# Patient Record
Sex: FEMALE | Race: WHITE | NOT HISPANIC OR LATINO
[De-identification: names, ages, dates, MRNs, and addresses within clinical notes are randomized per-mention and may not be internally consistent; named-entity substitution may affect disease eponyms.]

---

## 2017-08-02 ENCOUNTER — APPOINTMENT (OUTPATIENT)
Dept: SPEECH THERAPY | Facility: CLINIC | Age: 62
End: 2017-08-02

## 2017-08-02 ENCOUNTER — OUTPATIENT (OUTPATIENT)
Dept: OUTPATIENT SERVICES | Facility: HOSPITAL | Age: 62
LOS: 1 days | Discharge: HOME | End: 2017-08-02

## 2017-08-15 ENCOUNTER — APPOINTMENT (OUTPATIENT)
Dept: SPEECH THERAPY | Facility: CLINIC | Age: 62
End: 2017-08-15

## 2017-08-22 ENCOUNTER — APPOINTMENT (OUTPATIENT)
Dept: SPEECH THERAPY | Facility: CLINIC | Age: 62
End: 2017-08-22

## 2017-08-24 ENCOUNTER — OUTPATIENT (OUTPATIENT)
Dept: OUTPATIENT SERVICES | Facility: HOSPITAL | Age: 62
LOS: 1 days | End: 2017-08-24
Payer: COMMERCIAL

## 2017-08-24 ENCOUNTER — APPOINTMENT (OUTPATIENT)
Dept: MAMMOGRAPHY | Facility: HOSPITAL | Age: 62
End: 2017-08-24
Payer: COMMERCIAL

## 2017-08-24 DIAGNOSIS — Z12.31 ENCOUNTER FOR SCREENING MAMMOGRAM FOR MALIGNANT NEOPLASM OF BREAST: ICD-10-CM

## 2017-08-24 PROCEDURE — 77063 BREAST TOMOSYNTHESIS BI: CPT | Mod: 26

## 2017-08-24 PROCEDURE — 77067 SCR MAMMO BI INCL CAD: CPT

## 2017-08-24 PROCEDURE — 77063 BREAST TOMOSYNTHESIS BI: CPT

## 2017-08-24 PROCEDURE — G0202: CPT | Mod: 26

## 2017-08-25 DIAGNOSIS — R49.0 DYSPHONIA: ICD-10-CM

## 2017-08-29 ENCOUNTER — APPOINTMENT (OUTPATIENT)
Dept: SPEECH THERAPY | Facility: CLINIC | Age: 62
End: 2017-08-29

## 2018-08-27 ENCOUNTER — APPOINTMENT (OUTPATIENT)
Dept: MAMMOGRAPHY | Facility: HOSPITAL | Age: 63
End: 2018-08-27

## 2018-10-26 ENCOUNTER — APPOINTMENT (OUTPATIENT)
Dept: ULTRASOUND IMAGING | Facility: HOSPITAL | Age: 63
End: 2018-10-26
Payer: COMMERCIAL

## 2018-10-26 ENCOUNTER — APPOINTMENT (OUTPATIENT)
Dept: MAMMOGRAPHY | Facility: HOSPITAL | Age: 63
End: 2018-10-26
Payer: COMMERCIAL

## 2018-10-26 ENCOUNTER — OUTPATIENT (OUTPATIENT)
Dept: OUTPATIENT SERVICES | Facility: HOSPITAL | Age: 63
LOS: 1 days | End: 2018-10-26
Payer: COMMERCIAL

## 2018-10-26 DIAGNOSIS — Z00.8 ENCOUNTER FOR OTHER GENERAL EXAMINATION: ICD-10-CM

## 2018-10-26 PROCEDURE — 77063 BREAST TOMOSYNTHESIS BI: CPT | Mod: 26

## 2018-10-26 PROCEDURE — 76641 ULTRASOUND BREAST COMPLETE: CPT

## 2018-10-26 PROCEDURE — 76641 ULTRASOUND BREAST COMPLETE: CPT | Mod: 26

## 2018-10-26 PROCEDURE — 77063 BREAST TOMOSYNTHESIS BI: CPT

## 2018-10-26 PROCEDURE — 77067 SCR MAMMO BI INCL CAD: CPT

## 2018-10-26 PROCEDURE — 77067 SCR MAMMO BI INCL CAD: CPT | Mod: 26

## 2018-11-09 ENCOUNTER — APPOINTMENT (OUTPATIENT)
Dept: RADIOLOGY | Facility: HOSPITAL | Age: 63
End: 2018-11-09
Payer: COMMERCIAL

## 2018-11-09 ENCOUNTER — OUTPATIENT (OUTPATIENT)
Dept: OUTPATIENT SERVICES | Facility: HOSPITAL | Age: 63
LOS: 1 days | End: 2018-11-09
Payer: COMMERCIAL

## 2018-11-09 DIAGNOSIS — M79.602 PAIN IN LEFT ARM: ICD-10-CM

## 2018-11-09 PROCEDURE — 73070 X-RAY EXAM OF ELBOW: CPT | Mod: 26,LT

## 2018-11-09 PROCEDURE — 73070 X-RAY EXAM OF ELBOW: CPT

## 2019-07-29 ENCOUNTER — EMERGENCY (EMERGENCY)
Facility: HOSPITAL | Age: 64
LOS: 1 days | Discharge: HOME | End: 2019-07-29
Attending: EMERGENCY MEDICINE | Admitting: EMERGENCY MEDICINE
Payer: COMMERCIAL

## 2019-07-29 VITALS
DIASTOLIC BLOOD PRESSURE: 81 MMHG | OXYGEN SATURATION: 98 % | SYSTOLIC BLOOD PRESSURE: 156 MMHG | RESPIRATION RATE: 18 BRPM

## 2019-07-29 VITALS
RESPIRATION RATE: 18 BRPM | HEIGHT: 66 IN | WEIGHT: 190.04 LBS | DIASTOLIC BLOOD PRESSURE: 91 MMHG | HEART RATE: 80 BPM | SYSTOLIC BLOOD PRESSURE: 176 MMHG | OXYGEN SATURATION: 98 % | TEMPERATURE: 98 F

## 2019-07-29 LAB
ALBUMIN SERPL ELPH-MCNC: 4.1 G/DL — SIGNIFICANT CHANGE UP (ref 3.3–5)
ALP SERPL-CCNC: 53 U/L — SIGNIFICANT CHANGE UP (ref 40–120)
ALT FLD-CCNC: 28 U/L DA — SIGNIFICANT CHANGE UP (ref 10–45)
ANION GAP SERPL CALC-SCNC: 11 MMOL/L — SIGNIFICANT CHANGE UP (ref 5–17)
APPEARANCE UR: CLEAR — SIGNIFICANT CHANGE UP
AST SERPL-CCNC: 17 U/L — SIGNIFICANT CHANGE UP (ref 10–40)
BACTERIA # UR AUTO: ABNORMAL /HPF
BASOPHILS # BLD AUTO: 0.04 K/UL — SIGNIFICANT CHANGE UP (ref 0–0.2)
BASOPHILS NFR BLD AUTO: 0.4 % — SIGNIFICANT CHANGE UP (ref 0–2)
BILIRUB SERPL-MCNC: 0.4 MG/DL — SIGNIFICANT CHANGE UP (ref 0.2–1.2)
BILIRUB UR-MCNC: NEGATIVE — SIGNIFICANT CHANGE UP
BUN SERPL-MCNC: 12 MG/DL — SIGNIFICANT CHANGE UP (ref 7–23)
CALCIUM SERPL-MCNC: 9.1 MG/DL — SIGNIFICANT CHANGE UP (ref 8.4–10.5)
CHLORIDE SERPL-SCNC: 103 MMOL/L — SIGNIFICANT CHANGE UP (ref 96–108)
CO2 SERPL-SCNC: 26 MMOL/L — SIGNIFICANT CHANGE UP (ref 22–31)
COLOR SPEC: YELLOW — SIGNIFICANT CHANGE UP
CREAT SERPL-MCNC: 0.94 MG/DL — SIGNIFICANT CHANGE UP (ref 0.5–1.3)
DIFF PNL FLD: ABNORMAL
EOSINOPHIL # BLD AUTO: 0.18 K/UL — SIGNIFICANT CHANGE UP (ref 0–0.5)
EOSINOPHIL NFR BLD AUTO: 2 % — SIGNIFICANT CHANGE UP (ref 0–6)
EPI CELLS # UR: SIGNIFICANT CHANGE UP
GLUCOSE SERPL-MCNC: 117 MG/DL — HIGH (ref 70–99)
GLUCOSE UR QL: NEGATIVE — SIGNIFICANT CHANGE UP
HCT VFR BLD CALC: 44.6 % — SIGNIFICANT CHANGE UP (ref 34.5–45)
HGB BLD-MCNC: 14.9 G/DL — SIGNIFICANT CHANGE UP (ref 11.5–15.5)
IMM GRANULOCYTES NFR BLD AUTO: 0.2 % — SIGNIFICANT CHANGE UP (ref 0–1.5)
KETONES UR-MCNC: NEGATIVE — SIGNIFICANT CHANGE UP
LEUKOCYTE ESTERASE UR-ACNC: NEGATIVE — SIGNIFICANT CHANGE UP
LYMPHOCYTES # BLD AUTO: 1.64 K/UL — SIGNIFICANT CHANGE UP (ref 1–3.3)
LYMPHOCYTES # BLD AUTO: 17.9 % — SIGNIFICANT CHANGE UP (ref 13–44)
MCHC RBC-ENTMCNC: 28.7 PG — SIGNIFICANT CHANGE UP (ref 27–34)
MCHC RBC-ENTMCNC: 33.4 GM/DL — SIGNIFICANT CHANGE UP (ref 32–36)
MCV RBC AUTO: 85.9 FL — SIGNIFICANT CHANGE UP (ref 80–100)
MONOCYTES # BLD AUTO: 0.5 K/UL — SIGNIFICANT CHANGE UP (ref 0–0.9)
MONOCYTES NFR BLD AUTO: 5.5 % — SIGNIFICANT CHANGE UP (ref 2–14)
NEUTROPHILS # BLD AUTO: 6.78 K/UL — SIGNIFICANT CHANGE UP (ref 1.8–7.4)
NEUTROPHILS NFR BLD AUTO: 74 % — SIGNIFICANT CHANGE UP (ref 43–77)
NITRITE UR-MCNC: NEGATIVE — SIGNIFICANT CHANGE UP
NRBC # BLD: 0 /100 WBCS — SIGNIFICANT CHANGE UP (ref 0–0)
PH UR: 7 — SIGNIFICANT CHANGE UP (ref 5–8)
PLATELET # BLD AUTO: 245 K/UL — SIGNIFICANT CHANGE UP (ref 150–400)
POTASSIUM SERPL-MCNC: 4.1 MMOL/L — SIGNIFICANT CHANGE UP (ref 3.5–5.3)
POTASSIUM SERPL-SCNC: 4.1 MMOL/L — SIGNIFICANT CHANGE UP (ref 3.5–5.3)
PROT SERPL-MCNC: 7.5 G/DL — SIGNIFICANT CHANGE UP (ref 6–8.3)
PROT UR-MCNC: NEGATIVE — SIGNIFICANT CHANGE UP
RBC # BLD: 5.19 M/UL — SIGNIFICANT CHANGE UP (ref 3.8–5.2)
RBC # FLD: 13.2 % — SIGNIFICANT CHANGE UP (ref 10.3–14.5)
RBC CASTS # UR COMP ASSIST: SIGNIFICANT CHANGE UP /HPF (ref 0–4)
SODIUM SERPL-SCNC: 140 MMOL/L — SIGNIFICANT CHANGE UP (ref 135–145)
SP GR SPEC: 1.01 — SIGNIFICANT CHANGE UP (ref 1.01–1.02)
UROBILINOGEN FLD QL: NEGATIVE — SIGNIFICANT CHANGE UP
WBC # BLD: 9.16 K/UL — SIGNIFICANT CHANGE UP (ref 3.8–10.5)
WBC # FLD AUTO: 9.16 K/UL — SIGNIFICANT CHANGE UP (ref 3.8–10.5)
WBC UR QL: NEGATIVE /HPF — SIGNIFICANT CHANGE UP (ref 0–5)

## 2019-07-29 PROCEDURE — 99284 EMERGENCY DEPT VISIT MOD MDM: CPT

## 2019-07-29 PROCEDURE — 80053 COMPREHEN METABOLIC PANEL: CPT

## 2019-07-29 PROCEDURE — 85027 COMPLETE CBC AUTOMATED: CPT

## 2019-07-29 PROCEDURE — 36415 COLL VENOUS BLD VENIPUNCTURE: CPT

## 2019-07-29 PROCEDURE — 81001 URINALYSIS AUTO W/SCOPE: CPT

## 2019-07-29 PROCEDURE — 96374 THER/PROPH/DIAG INJ IV PUSH: CPT

## 2019-07-29 PROCEDURE — 99284 EMERGENCY DEPT VISIT MOD MDM: CPT | Mod: 25

## 2019-07-29 PROCEDURE — 96361 HYDRATE IV INFUSION ADD-ON: CPT

## 2019-07-29 RX ORDER — ONDANSETRON 8 MG/1
4 TABLET, FILM COATED ORAL ONCE
Refills: 0 | Status: COMPLETED | OUTPATIENT
Start: 2019-07-29 | End: 2019-07-29

## 2019-07-29 RX ORDER — MECLIZINE HCL 12.5 MG
25 TABLET ORAL ONCE
Refills: 0 | Status: COMPLETED | OUTPATIENT
Start: 2019-07-29 | End: 2019-07-29

## 2019-07-29 RX ORDER — MECLIZINE HCL 12.5 MG
1 TABLET ORAL
Qty: 15 | Refills: 0
Start: 2019-07-29 | End: 2019-08-02

## 2019-07-29 RX ORDER — SODIUM CHLORIDE 9 MG/ML
1000 INJECTION INTRAMUSCULAR; INTRAVENOUS; SUBCUTANEOUS ONCE
Refills: 0 | Status: COMPLETED | OUTPATIENT
Start: 2019-07-29 | End: 2019-07-29

## 2019-07-29 RX ORDER — DIAZEPAM 5 MG
5 TABLET ORAL ONCE
Refills: 0 | Status: DISCONTINUED | OUTPATIENT
Start: 2019-07-29 | End: 2019-07-29

## 2019-07-29 RX ADMIN — Medication 5 MILLIGRAM(S): at 22:13

## 2019-07-29 RX ADMIN — SODIUM CHLORIDE 1000 MILLILITER(S): 9 INJECTION INTRAMUSCULAR; INTRAVENOUS; SUBCUTANEOUS at 20:42

## 2019-07-29 RX ADMIN — ONDANSETRON 4 MILLIGRAM(S): 8 TABLET, FILM COATED ORAL at 20:41

## 2019-07-29 RX ADMIN — Medication 25 MILLIGRAM(S): at 20:41

## 2019-07-29 RX ADMIN — SODIUM CHLORIDE 1000 MILLILITER(S): 9 INJECTION INTRAMUSCULAR; INTRAVENOUS; SUBCUTANEOUS at 21:30

## 2019-07-29 NOTE — ED PROVIDER NOTE - OBJECTIVE STATEMENT
pt states that today she had sudden onset of severe dizziness, described as room spinning and sense of motion, feels very nauseous, denies any headache, weakness, change in speech.  symptoms better when sitting up and worse with lying down.

## 2019-07-29 NOTE — ED ADULT NURSE NOTE - NS TRANSFER PATIENT BELONGINGS
Clothing/Cell Phone/PDA (specify) rings pocketbook money with /Clothing/Jewelry/Money (specify)/Cell Phone/PDA (specify)/Wrist Watch

## 2019-07-29 NOTE — ED PROVIDER NOTE - CARE PROVIDER_API CALL
Jeb Soliman (MD)  Neurology  [address], [address]  [place], [place] [id number]  Phone: [phone]  Fax: [phone]  Follow Up Time:

## 2019-07-29 NOTE — ED ADULT TRIAGE NOTE - CHIEF COMPLAINT QUOTE
Pt presents to the ER complaining of dizziness, nausea and feeling like she is going to passed out. Pt denies chest pain and SOB. Pt has a history of vertigo.

## 2019-07-29 NOTE — ED PROVIDER NOTE - PHYSICAL EXAMINATION
Gen:  alert, awake, no acute distress  HEENT:  atraumatic head, airway clear, pupils equal and round  CV:  rrr, nl S1, S2, no m/r/g  Pulm:  lungs CTA b/l  Abd: s/nt/nd, +BS  Ext:  moving all extremities  Neuro:  grossly intact, no focal deficits, severe room spinning when lying patient down  Skin:  clear, dry, intact  Psych: AOx3, normal affect, no apparent risk to self or others

## 2019-08-20 PROBLEM — I34.1 NONRHEUMATIC MITRAL (VALVE) PROLAPSE: Chronic | Status: ACTIVE | Noted: 2019-07-29

## 2019-10-28 ENCOUNTER — OUTPATIENT (OUTPATIENT)
Dept: OUTPATIENT SERVICES | Facility: HOSPITAL | Age: 64
LOS: 1 days | End: 2019-10-28
Payer: MEDICARE

## 2019-10-28 ENCOUNTER — APPOINTMENT (OUTPATIENT)
Dept: ULTRASOUND IMAGING | Facility: HOSPITAL | Age: 64
End: 2019-10-28
Payer: MEDICARE

## 2019-10-28 ENCOUNTER — APPOINTMENT (OUTPATIENT)
Dept: MAMMOGRAPHY | Facility: HOSPITAL | Age: 64
End: 2019-10-28
Payer: MEDICARE

## 2019-10-28 DIAGNOSIS — Z00.8 ENCOUNTER FOR OTHER GENERAL EXAMINATION: ICD-10-CM

## 2019-10-28 PROCEDURE — 76641 ULTRASOUND BREAST COMPLETE: CPT | Mod: 26,50

## 2019-10-28 PROCEDURE — 76641 ULTRASOUND BREAST COMPLETE: CPT

## 2019-10-28 PROCEDURE — 77067 SCR MAMMO BI INCL CAD: CPT

## 2019-10-28 PROCEDURE — 77063 BREAST TOMOSYNTHESIS BI: CPT

## 2019-10-28 PROCEDURE — 77067 SCR MAMMO BI INCL CAD: CPT | Mod: 26

## 2019-10-28 PROCEDURE — 77063 BREAST TOMOSYNTHESIS BI: CPT | Mod: 26

## 2019-11-14 ENCOUNTER — TRANSCRIPTION ENCOUNTER (OUTPATIENT)
Age: 64
End: 2019-11-14

## 2020-01-22 ENCOUNTER — OUTPATIENT (OUTPATIENT)
Dept: OUTPATIENT SERVICES | Facility: HOSPITAL | Age: 65
LOS: 1 days | End: 2020-01-22
Payer: MEDICARE

## 2020-01-22 ENCOUNTER — APPOINTMENT (OUTPATIENT)
Dept: ULTRASOUND IMAGING | Facility: HOSPITAL | Age: 65
End: 2020-01-22
Payer: MEDICARE

## 2020-01-22 DIAGNOSIS — Z00.8 ENCOUNTER FOR OTHER GENERAL EXAMINATION: ICD-10-CM

## 2020-01-22 PROCEDURE — 93880 EXTRACRANIAL BILAT STUDY: CPT

## 2020-01-22 PROCEDURE — 93880 EXTRACRANIAL BILAT STUDY: CPT | Mod: 26

## 2020-11-02 ENCOUNTER — APPOINTMENT (OUTPATIENT)
Dept: MAMMOGRAPHY | Facility: HOSPITAL | Age: 65
End: 2020-11-02
Payer: MEDICARE

## 2020-11-02 ENCOUNTER — OUTPATIENT (OUTPATIENT)
Dept: OUTPATIENT SERVICES | Facility: HOSPITAL | Age: 65
LOS: 1 days | End: 2020-11-02
Payer: MEDICARE

## 2020-11-02 DIAGNOSIS — Z00.8 ENCOUNTER FOR OTHER GENERAL EXAMINATION: ICD-10-CM

## 2020-11-02 PROCEDURE — 77063 BREAST TOMOSYNTHESIS BI: CPT

## 2020-11-02 PROCEDURE — 77063 BREAST TOMOSYNTHESIS BI: CPT | Mod: 26

## 2020-11-02 PROCEDURE — 77067 SCR MAMMO BI INCL CAD: CPT

## 2020-11-02 PROCEDURE — 77067 SCR MAMMO BI INCL CAD: CPT | Mod: 26

## 2021-09-23 ENCOUNTER — NON-APPOINTMENT (OUTPATIENT)
Age: 66
End: 2021-09-23

## 2021-09-23 ENCOUNTER — APPOINTMENT (OUTPATIENT)
Dept: ORTHOPEDIC SURGERY | Facility: CLINIC | Age: 66
End: 2021-09-23
Payer: MEDICARE

## 2021-09-23 VITALS — HEIGHT: 65 IN | WEIGHT: 190 LBS | BODY MASS INDEX: 31.65 KG/M2

## 2021-09-23 DIAGNOSIS — M75.42 IMPINGEMENT SYNDROME OF LEFT SHOULDER: ICD-10-CM

## 2021-09-23 PROCEDURE — 99203 OFFICE O/P NEW LOW 30 MIN: CPT

## 2021-09-23 PROCEDURE — 73030 X-RAY EXAM OF SHOULDER: CPT | Mod: LT

## 2021-09-23 NOTE — HISTORY OF PRESENT ILLNESS
[de-identified] : HOUSTON BARRON is a 65 year old RHD female presenting to the office complaining of left shoulder pain. Patient reports pain since ~ March 2021. Patient denies injury or trauma to the area. The patient describes the pain as a dull aching, and occasionally sharp pain localized to the anterior aspect of her  left shoulder that is intermittent in nature. She notes the pain radiates down the lateral aspect of the left upper extremity. Her  symptoms are exacerbated worsened by lifting, repetitive use/activity, lying on the affected side, and overhead activities. Patient reports the pain is not waking her up at night.  Patient reports associated weakness. Denies numbness and tingling in the upper extremity. \par Patient is taking NSAIDs/ Tylenol / Heat/ topical analgesics for pain relief with mild to moderate  relief in symptoms. \par Patient denies any other complaints at this time. \par

## 2021-09-23 NOTE — PHYSICAL EXAM
[de-identified] : Right Upper Extremity\par o Shoulder :\par ¦ Inspection/Palpation :  no tenderness over the greater tuberosity, no acromioclavicular joint tenderness, no tenderness anterior and posterior glenohumeral joint,no swelling, no deformities\par ¦ Range of Motion : ACTIVE FORWARD ELEVATION: Measured at 140 degrees, ACTIVE EXTERNAL ROTATION: Measured at 70 degrees, ACTIVE INTERNAL ROTATION: Measured at T10\par ¦ Strength : external rotation 5/5, internal rotation 5/5, supraspinatus 5/5\par ¦ Stability : no joint instability on provocative testing\par ¦ Tests/Signs : Neer (-), Nichols (-)\par o Upper Arm : no tenderness, no swelling, no deformities\par o Muscle Bulk : no atrophy\par o Sensation : sensation intact to light touch\par o Skin : no skin rash or discoloration\par o Vascular Exam : no edema, no cyanosis, radial and ulnar pulses normal \par \par Left Upper Extremity\par o Shoulder :\par ¦ Inspection/Palpation : tenderness over the greater tuberosity, no acromioclavicular joint tenderness,no  tenderness anterior and mild tenderness posterior glenohumeral joint,no swelling, no deformities\par ¦ Range of Motion : ACTIVE FORWARD ELEVATION: Measured at 135 degrees, ACTIVE EXTERNAL ROTATION: Measured at 65 degrees, ACTIVE INTERNAL ROTATION: Measured at L3\par ¦ Strength : external rotation 5/5, internal rotation 5/5, supraspinatus 5/5 with pain. \par ¦ Stability : no joint instability on provocative testing\par ¦ Tests/Signs : Neer (+), Nichols (+) Speed’s Test (-)\par o Upper Arm : no tenderness, no swelling, no deformities\par o Muscle Bulk : no atrophy\par o Sensation : sensation intact to light touch\par o Skin : no skin rash or discoloration\par o Vascular Exam : no edema, no cyanosis, radial and ulnar pulses normal\par  [de-identified] : o  LEFT Shoulder : Grashey, Axillary and Outlet views were obtained, there are no soft tissue abnormalities, no fractures, alignment is normal, normal appearing joint spaces, normal bone density, no bony lesions.\par \par

## 2021-09-23 NOTE — DISCUSSION/SUMMARY
[de-identified] : The underlying pathophysiology was reviewed in great detail with the patient as well as the various treatment options, including ice, analgesics, NSAIDs, Physical therapy, steroid injections.\par \par A prescription for Physical Therapy was provided.\par \par Activity modifications and restrictions were discussed. I advised avoiding overhead lifting. I advised the patient to work on good posture. \par \par A home exercise sheet was given and discussed with the patient to follow. \par \par FU 6 weeks. \par \par All questions were answered, all alternatives discussed and the patient is in complete agreement with that plan. Follow-up appointment as instructed. Any issues and the patient will call or come in sooner.\par

## 2021-11-02 ENCOUNTER — APPOINTMENT (OUTPATIENT)
Dept: MAMMOGRAPHY | Facility: HOSPITAL | Age: 66
End: 2021-11-02
Payer: MEDICARE

## 2021-11-02 ENCOUNTER — OUTPATIENT (OUTPATIENT)
Dept: OUTPATIENT SERVICES | Facility: HOSPITAL | Age: 66
LOS: 1 days | End: 2021-11-02
Payer: MEDICARE

## 2021-11-02 DIAGNOSIS — Z00.8 ENCOUNTER FOR OTHER GENERAL EXAMINATION: ICD-10-CM

## 2021-11-02 PROCEDURE — 77067 SCR MAMMO BI INCL CAD: CPT

## 2021-11-02 PROCEDURE — 77063 BREAST TOMOSYNTHESIS BI: CPT

## 2021-11-02 PROCEDURE — 77067 SCR MAMMO BI INCL CAD: CPT | Mod: 26

## 2021-11-02 PROCEDURE — 77063 BREAST TOMOSYNTHESIS BI: CPT | Mod: 26

## 2021-12-23 ENCOUNTER — TRANSCRIPTION ENCOUNTER (OUTPATIENT)
Age: 66
End: 2021-12-23

## 2022-01-14 ENCOUNTER — APPOINTMENT (OUTPATIENT)
Dept: INTERNAL MEDICINE | Facility: CLINIC | Age: 67
End: 2022-01-14

## 2022-03-05 ENCOUNTER — APPOINTMENT (OUTPATIENT)
Dept: INTERNAL MEDICINE | Facility: CLINIC | Age: 67
End: 2022-03-05
Payer: MEDICARE

## 2022-03-05 VITALS
HEIGHT: 65 IN | HEART RATE: 72 BPM | BODY MASS INDEX: 33.32 KG/M2 | RESPIRATION RATE: 14 BRPM | OXYGEN SATURATION: 98 % | SYSTOLIC BLOOD PRESSURE: 152 MMHG | WEIGHT: 200 LBS | TEMPERATURE: 97.3 F | DIASTOLIC BLOOD PRESSURE: 100 MMHG

## 2022-03-05 VITALS — SYSTOLIC BLOOD PRESSURE: 128 MMHG | DIASTOLIC BLOOD PRESSURE: 78 MMHG

## 2022-03-05 DIAGNOSIS — Z86.79 PERSONAL HISTORY OF OTHER DISEASES OF THE CIRCULATORY SYSTEM: ICD-10-CM

## 2022-03-05 PROCEDURE — G0438: CPT

## 2022-03-05 NOTE — PHYSICAL EXAM
[No Acute Distress] : no acute distress [Well Nourished] : well nourished [Well Developed] : well developed [Well-Appearing] : well-appearing [Normal Sclera/Conjunctiva] : normal sclera/conjunctiva [PERRL] : pupils equal round and reactive to light [EOMI] : extraocular movements intact [Normal Outer Ear/Nose] : the outer ears and nose were normal in appearance [Normal Oropharynx] : the oropharynx was normal [No JVD] : no jugular venous distention [No Lymphadenopathy] : no lymphadenopathy [Supple] : supple [Thyroid Normal, No Nodules] : the thyroid was normal and there were no nodules present [No Respiratory Distress] : no respiratory distress  [No Accessory Muscle Use] : no accessory muscle use [Clear to Auscultation] : lungs were clear to auscultation bilaterally [Regular Rhythm] : with a regular rhythm [Normal Rate] : normal rate  [Normal S1, S2] : normal S1 and S2 [No Murmur] : no murmur heard [No Carotid Bruits] : no carotid bruits [No Abdominal Bruit] : a ~M bruit was not heard ~T in the abdomen [No Varicosities] : no varicosities [Pedal Pulses Present] : the pedal pulses are present [No Edema] : there was no peripheral edema [No Palpable Aorta] : no palpable aorta [No Extremity Clubbing/Cyanosis] : no extremity clubbing/cyanosis [Non Tender] : non-tender [Soft] : abdomen soft [Non-distended] : non-distended [No Masses] : no abdominal mass palpated [No HSM] : no HSM [Normal Bowel Sounds] : normal bowel sounds [Normal Posterior Cervical Nodes] : no posterior cervical lymphadenopathy [Normal Anterior Cervical Nodes] : no anterior cervical lymphadenopathy [No CVA Tenderness] : no CVA  tenderness [No Spinal Tenderness] : no spinal tenderness [No Joint Swelling] : no joint swelling [Grossly Normal Strength/Tone] : grossly normal strength/tone [No Rash] : no rash [Coordination Grossly Intact] : coordination grossly intact [No Focal Deficits] : no focal deficits [Normal Gait] : normal gait [Normal Affect] : the affect was normal [Deep Tendon Reflexes (DTR)] : deep tendon reflexes were 2+ and symmetric [Normal Insight/Judgement] : insight and judgment were intact

## 2022-03-07 NOTE — HEALTH RISK ASSESSMENT
[Good] : ~his/her~  mood as  good [No falls in past year] : Patient reported no falls in the past year [0] : 2) Feeling down, depressed, or hopeless: Not at all (0) [Patient reported mammogram was normal] : Patient reported mammogram was normal [Patient reported PAP Smear was normal] : Patient reported PAP Smear was normal [Patient reported colonoscopy was normal] : Patient reported colonoscopy was normal [MammogramDate] : 01/22 [PapSmearDate] : 01/22 [ColonoscopyDate] : 2015

## 2022-03-07 NOTE — HISTORY OF PRESENT ILLNESS
[de-identified] : Here today for CP.\par History of Hypertension- stable on Meds.\par History of back pain and OA.

## 2022-03-08 ENCOUNTER — NON-APPOINTMENT (OUTPATIENT)
Age: 67
End: 2022-03-08

## 2022-03-08 LAB
CCP AB SER IA-ACNC: <8 UNITS
DSDNA AB SER-ACNC: <12 IU/ML
RF+CCP IGG SER-IMP: NEGATIVE
RHEUMATOID FACT SER QL: 19 IU/ML

## 2022-03-09 LAB — ANA SER IF-ACNC: NEGATIVE

## 2022-03-16 ENCOUNTER — APPOINTMENT (OUTPATIENT)
Dept: RHEUMATOLOGY | Facility: CLINIC | Age: 67
End: 2022-03-16
Payer: MEDICARE

## 2022-03-16 ENCOUNTER — NON-APPOINTMENT (OUTPATIENT)
Age: 67
End: 2022-03-16

## 2022-03-16 VITALS
HEIGHT: 65 IN | WEIGHT: 200 LBS | DIASTOLIC BLOOD PRESSURE: 79 MMHG | RESPIRATION RATE: 16 BRPM | OXYGEN SATURATION: 96 % | HEART RATE: 80 BPM | BODY MASS INDEX: 33.32 KG/M2 | TEMPERATURE: 97.6 F | SYSTOLIC BLOOD PRESSURE: 140 MMHG

## 2022-03-16 DIAGNOSIS — M25.50 PAIN IN UNSPECIFIED JOINT: ICD-10-CM

## 2022-03-16 DIAGNOSIS — M79.651 PAIN IN RIGHT THIGH: ICD-10-CM

## 2022-03-16 DIAGNOSIS — M79.652 PAIN IN RIGHT THIGH: ICD-10-CM

## 2022-03-16 PROCEDURE — 99204 OFFICE O/P NEW MOD 45 MIN: CPT

## 2022-03-16 NOTE — PHYSICAL EXAM
[General Appearance - Alert] : alert [General Appearance - In No Acute Distress] : in no acute distress [General Appearance - Well Nourished] : well nourished [Oriented To Time, Place, And Person] : oriented to person, place, and time [Impaired Insight] : insight and judgment were intact [Affect] : the affect was normal [Sclera] : the sclera and conjunctiva were normal [PERRL With Normal Accommodation] : pupils were equal in size, round, and reactive to light [Extraocular Movements] : extraocular movements were intact [Oropharynx] : the oropharynx was normal [Neck Appearance] : the appearance of the neck was normal [Auscultation Breath Sounds / Voice Sounds] : lungs were clear to auscultation bilaterally [Heart Rate And Rhythm] : heart rate was normal and rhythm regular [Heart Sounds] : normal S1 and S2 [Edema] : there was no peripheral edema [Abdomen Soft] : soft [Abdomen Tenderness] : non-tender [No Spinal Tenderness] : no spinal tenderness [Abnormal Walk] : normal gait [Nail Clubbing] : no clubbing  or cyanosis of the fingernails [Musculoskeletal - Swelling] : no joint swelling seen [Motor Tone] : muscle strength and tone were normal [] : no rash [No Focal Deficits] : no focal deficits [FreeTextEntry1] : No overt OA but slightly limited extension over L knee, no popliteal fullness, no thigh TTP or induration  [Motor Exam] : the motor exam was normal

## 2022-03-16 NOTE — ASSESSMENT
[FreeTextEntry1] : HOUSTON BARRON is a 66 year old woman who presents with low + RF in setting of unilateral L knee pain with known hx of OA/meniscal injury at this site, LBP in setting of DJD and achy thigh pain at night which is positional and new since flare of LBP, likely mild radicular sx. Paucity of inflammatory arthritis or CTD sx today. \par \par -  reviewed labs in detail with patient, all questions answered \par - Discussed that RF can be seen in normal population, and + Ab alone does not confer dx of RA, additionally she does not clinically meet criteria for inflammatory arthritis at present \par - reviewed sx of inflammatory arthritis, RTC prn if develops any\par - PT referral \par - conservative measures for OA reviewed with her -- moist heat/ heating pad, topical voltaren gel, prn tylenol, massage, stretching \par - weight loss of 10% of current weight can offload joints, she will be seeing a nutritionist to help with this \par - return to ortho for gel injections if above does not improve L knee sx\par

## 2022-03-16 NOTE — HISTORY OF PRESENT ILLNESS
[FreeTextEntry1] : HOUSTON BARRON is a 66 year old woman who presents with + RF 19 checked in setting of joint pain as below-\par \par + L knee chronic meniscal injury, s/p gel 5 years ago which helped until recently, pulling in popliteal space, worst in the morning, ROM is ok, R knee not affected \par + dull achy pain in b/l thighs at nighttime chronically, linked to LBP, improved with tylenol and heating pad\par + LBP with cold weather, no sciatica, known DJD \par + chronic L shoulder impingement syndrome -- self resolved \par \par + IBS\par + fatty liver \par \par Inflammatory arthritis ROS negative for symmetrical peripheral joint synovitis, prolonged AM stiffness, enthesitis, dactylitis, psoriasis/ rashes, eye inflammation, inflammatory low back pain, IBD. \par \par SLE ROS negative for alopecia, sicca, salivary gland swelling, oral ulcers, malar rash, photosensitivity, SOB, chest pain, serositis, abd pain, dysuria, hematuria, rash, joint AM stiffness/synovitis, hematologic abnormalities, Raynauds. APLS ROS -  no thrombotic events. \par \par Labs - Neg OLMAN, dsDNA, CCP

## 2022-08-24 ENCOUNTER — APPOINTMENT (OUTPATIENT)
Dept: INTERNAL MEDICINE | Facility: CLINIC | Age: 67
End: 2022-08-24

## 2022-08-24 DIAGNOSIS — M54.50 LOW BACK PAIN, UNSPECIFIED: ICD-10-CM

## 2022-08-24 PROCEDURE — 99443: CPT | Mod: 95

## 2022-08-26 ENCOUNTER — APPOINTMENT (OUTPATIENT)
Dept: RHEUMATOLOGY | Facility: CLINIC | Age: 67
End: 2022-08-26

## 2022-08-26 VITALS
SYSTOLIC BLOOD PRESSURE: 141 MMHG | RESPIRATION RATE: 16 BRPM | HEIGHT: 65 IN | OXYGEN SATURATION: 97 % | DIASTOLIC BLOOD PRESSURE: 85 MMHG | TEMPERATURE: 97.2 F | HEART RATE: 78 BPM

## 2022-08-26 DIAGNOSIS — M79.609 PAIN IN UNSPECIFIED LIMB: ICD-10-CM

## 2022-08-26 PROCEDURE — 99213 OFFICE O/P EST LOW 20 MIN: CPT

## 2022-08-27 ENCOUNTER — RESULT REVIEW (OUTPATIENT)
Age: 67
End: 2022-08-27

## 2022-08-27 ENCOUNTER — APPOINTMENT (OUTPATIENT)
Dept: RADIOLOGY | Facility: HOSPITAL | Age: 67
End: 2022-08-27

## 2022-08-27 ENCOUNTER — OUTPATIENT (OUTPATIENT)
Dept: OUTPATIENT SERVICES | Facility: HOSPITAL | Age: 67
LOS: 1 days | End: 2022-08-27
Payer: MEDICARE

## 2022-08-27 DIAGNOSIS — M54.50 LOW BACK PAIN, UNSPECIFIED: ICD-10-CM

## 2022-08-27 DIAGNOSIS — M17.12 UNILATERAL PRIMARY OSTEOARTHRITIS, LEFT KNEE: ICD-10-CM

## 2022-08-27 LAB
CRP SERPL-MCNC: 4 MG/L
ERYTHROCYTE [SEDIMENTATION RATE] IN BLOOD BY WESTERGREN METHOD: 25 MM/HR
RHEUMATOID FACT SER QL: 19 IU/ML

## 2022-08-27 PROCEDURE — 72114 X-RAY EXAM L-S SPINE BENDING: CPT | Mod: 26

## 2022-08-27 PROCEDURE — 73562 X-RAY EXAM OF KNEE 3: CPT | Mod: 26,LT

## 2022-08-27 PROCEDURE — 72114 X-RAY EXAM L-S SPINE BENDING: CPT

## 2022-08-27 PROCEDURE — 73562 X-RAY EXAM OF KNEE 3: CPT

## 2022-08-29 RX ORDER — CYCLOBENZAPRINE HYDROCHLORIDE 10 MG/1
10 TABLET, FILM COATED ORAL
Qty: 14 | Refills: 0 | Status: DISCONTINUED | COMMUNITY
Start: 2022-08-24 | End: 2022-08-29

## 2022-08-30 LAB
CCP AB SER IA-ACNC: <8 UNITS
RF+CCP IGG SER-IMP: NEGATIVE

## 2022-09-06 ENCOUNTER — RX RENEWAL (OUTPATIENT)
Age: 67
End: 2022-09-06

## 2022-09-09 ENCOUNTER — RX RENEWAL (OUTPATIENT)
Age: 67
End: 2022-09-09

## 2022-09-19 ENCOUNTER — APPOINTMENT (OUTPATIENT)
Dept: ORTHOPEDIC SURGERY | Facility: CLINIC | Age: 67
End: 2022-09-19

## 2022-09-19 VITALS — WEIGHT: 200 LBS | HEIGHT: 65 IN | BODY MASS INDEX: 33.32 KG/M2

## 2022-09-19 DIAGNOSIS — M76.892 OTHER SPECIFIED ENTHESOPATHIES OF LEFT LOWER LIMB, EXCLUDING FOOT: ICD-10-CM

## 2022-09-19 PROCEDURE — 99214 OFFICE O/P EST MOD 30 MIN: CPT

## 2022-09-28 ENCOUNTER — NON-APPOINTMENT (OUTPATIENT)
Age: 67
End: 2022-09-28

## 2022-09-30 ENCOUNTER — APPOINTMENT (OUTPATIENT)
Dept: ULTRASOUND IMAGING | Facility: CLINIC | Age: 67
End: 2022-09-30

## 2022-10-16 NOTE — REASON FOR VISIT
[Follow-Up: _____] : a [unfilled] follow-up visit [Initial Evaluation] : an initial evaluation [FreeTextEntry1] : + RF, joint pain

## 2022-10-16 NOTE — HISTORY OF PRESENT ILLNESS
[FreeTextEntry1] : HOUSTON BARRON is a 66 year old woman who presents with + RF 19 checked in setting of joint pain as below-\par \par + L knee chronic meniscal injury, s/p gel 5 years ago which helped until recently, pulling in popliteal space, worst in the morning, ROM is ok, R knee not affected \par + dull achy pain in b/l thighs at nighttime chronically, linked to LBP, improved with tylenol and heating pad\par + LBP with cold weather, no sciatica, known DJD \par + chronic L shoulder impingement syndrome -- self resolved \par \par + IBS\par + fatty liver \par \par Inflammatory arthritis ROS negative for symmetrical peripheral joint synovitis, prolonged AM stiffness, enthesitis, dactylitis, psoriasis/ rashes, eye inflammation, inflammatory low back pain, IBD. \par \par SLE ROS negative for alopecia, sicca, salivary gland swelling, oral ulcers, malar rash, photosensitivity, SOB, chest pain, serositis, abd pain, dysuria, hematuria, rash, joint AM stiffness/synovitis, hematologic abnormalities, Raynauds. APLS ROS -  no thrombotic events. \par \par Labs - Neg OLMAN, dsDNA, CCP\par \par -----\par 8/26/22 -- 2 weeks of worsening L posterior knee pain, markedly worse over this recent weekend, cramping and radiation down leg. Taking Advil and using heating pad which is helping, down from 10/10 to 3/10 at present. LBP stable, not worse over same time period, remainder of joints quiescent.

## 2022-10-16 NOTE — PHYSICAL EXAM
[General Appearance - Alert] : alert [General Appearance - In No Acute Distress] : in no acute distress [General Appearance - Well Nourished] : well nourished [Sclera] : the sclera and conjunctiva were normal [PERRL With Normal Accommodation] : pupils were equal in size, round, and reactive to light [Extraocular Movements] : extraocular movements were intact [Oropharynx] : the oropharynx was normal [Neck Appearance] : the appearance of the neck was normal [Auscultation Breath Sounds / Voice Sounds] : lungs were clear to auscultation bilaterally [Heart Rate And Rhythm] : heart rate was normal and rhythm regular [Heart Sounds] : normal S1 and S2 [Edema] : there was no peripheral edema [Abdomen Soft] : soft [Abdomen Tenderness] : non-tender [No Spinal Tenderness] : no spinal tenderness [Abnormal Walk] : normal gait [Nail Clubbing] : no clubbing  or cyanosis of the fingernails [Musculoskeletal - Swelling] : no joint swelling seen [Motor Tone] : muscle strength and tone were normal [] : no rash [Motor Exam] : the motor exam was normal [No Focal Deficits] : no focal deficits [Oriented To Time, Place, And Person] : oriented to person, place, and time [Impaired Insight] : insight and judgment were intact [Affect] : the affect was normal [FreeTextEntry1] : No overt OA but slightly limited extension over L knee, + popliteal fullness but no pain, no synovitis

## 2022-10-16 NOTE — REVIEW OF SYSTEMS
[As Noted in HPI] : as noted in HPI [Arthralgias] : arthralgias [Joint Pain] : joint pain [Negative] : Heme/Lymph [Joint Swelling] : joint swelling

## 2022-10-16 NOTE — ASSESSMENT
[FreeTextEntry1] : HOUSTON BARRON is a 66 year old woman who presents with --\par \par # low + RF in setting of unilateral L knee pain with known hx of OA/meniscal injury at this site, recent popliteal pain which is improved tho some ongoing fullness. Remains without other inflammatory sx\par - check XR L knee and LS spine, ? partially from there \par - c/w conservative therapies for now, if worsening will let me know and will consider US eval \par - discussed low suspicion ongoing for systemic inflammatory process but check serologies to be thorough\par - weight loss of 10% of current weight can offload joints \par - return to ortho to eval as well \par \par # LBP in setting of DJD \par - as above \par \par RTC 2 months to re-eval

## 2022-10-24 NOTE — ED PROVIDER NOTE - DISCHARGE DATE
Release of information resent today  If do not receive records by time refill is due next week, will discuss with patient refill for 1 month until records obtained then will provide 3 month supplies going forward   29-Jul-2019

## 2022-10-27 ENCOUNTER — APPOINTMENT (OUTPATIENT)
Dept: RHEUMATOLOGY | Facility: CLINIC | Age: 67
End: 2022-10-27

## 2022-10-27 VITALS
HEART RATE: 85 BPM | TEMPERATURE: 97.3 F | WEIGHT: 200 LBS | OXYGEN SATURATION: 97 % | BODY MASS INDEX: 33.32 KG/M2 | RESPIRATION RATE: 16 BRPM | HEIGHT: 65 IN | DIASTOLIC BLOOD PRESSURE: 90 MMHG | SYSTOLIC BLOOD PRESSURE: 142 MMHG

## 2022-10-27 DIAGNOSIS — G89.29 PAIN IN LEFT ANKLE AND JOINTS OF LEFT FOOT: ICD-10-CM

## 2022-10-27 DIAGNOSIS — M25.572 PAIN IN LEFT ANKLE AND JOINTS OF LEFT FOOT: ICD-10-CM

## 2022-10-27 DIAGNOSIS — G89.29 LOW BACK PAIN, UNSPECIFIED: ICD-10-CM

## 2022-10-27 DIAGNOSIS — M17.11 UNILATERAL PRIMARY OSTEOARTHRITIS, RIGHT KNEE: ICD-10-CM

## 2022-10-27 DIAGNOSIS — M54.50 LOW BACK PAIN, UNSPECIFIED: ICD-10-CM

## 2022-10-27 PROCEDURE — 99213 OFFICE O/P EST LOW 20 MIN: CPT

## 2022-10-27 RX ORDER — HYDROCODONE BITARTRATE AND ACETAMINOPHEN 5; 325 MG/1; MG/1
5-325 TABLET ORAL
Qty: 10 | Refills: 0 | Status: COMPLETED | COMMUNITY
Start: 2022-05-06

## 2022-10-27 NOTE — PHYSICAL EXAM
[General Appearance - Alert] : alert [General Appearance - In No Acute Distress] : in no acute distress [General Appearance - Well Nourished] : well nourished [Sclera] : the sclera and conjunctiva were normal [PERRL With Normal Accommodation] : pupils were equal in size, round, and reactive to light [Extraocular Movements] : extraocular movements were intact [Neck Appearance] : the appearance of the neck was normal [Auscultation Breath Sounds / Voice Sounds] : lungs were clear to auscultation bilaterally [Heart Rate And Rhythm] : heart rate was normal and rhythm regular [Heart Sounds] : normal S1 and S2 [Edema] : there was no peripheral edema [No Spinal Tenderness] : no spinal tenderness [Abnormal Walk] : normal gait [Nail Clubbing] : no clubbing  or cyanosis of the fingernails [Musculoskeletal - Swelling] : no joint swelling seen [Motor Tone] : muscle strength and tone were normal [Motor Exam] : the motor exam was normal [No Focal Deficits] : no focal deficits [Oriented To Time, Place, And Person] : oriented to person, place, and time [Impaired Insight] : insight and judgment were intact [Affect] : the affect was normal [FreeTextEntry1] : No overt OA but slightly limited extension over L knee, + popliteal fullness but no pain, no synovitis. No ankle synovitis, ROM intact  [] : no rash

## 2022-10-27 NOTE — REVIEW OF SYSTEMS
[As Noted in HPI] : as noted in HPI [Arthralgias] : arthralgias [Joint Pain] : joint pain [Negative] : Heme/Lymph [Joint Swelling] : no joint swelling [Joint Stiffness] : no joint stiffness

## 2022-10-27 NOTE — ASSESSMENT
[FreeTextEntry1] : HOUSTON BARRON is a 67 year old woman who presents with --\par \par # low + RF in setting of unilateral L knee pain with known hx of OA/meniscal injury at this site, recent popliteal pain which is improved with PT, occasional likely MSK mediated L ankle pain. Remains without other inflammatory sx\par -  reviewed labs and imaging in detail with patient, all questions answered -- imaging of L spine and knee with OA changes but no inflammatory arthritis changes, serologies remain negative expect for low +RF which is not diagnostic without correlating clinical sx \par - c/w PT and then transition to HEP \par - weight loss of 10% of current weight can offload joints \par \par # LBP in setting of DJD \par - as above \par \par RTC prn if worsening sx in presently active joints or development of inflammatory sx

## 2022-10-27 NOTE — HISTORY OF PRESENT ILLNESS
[FreeTextEntry1] : HOUSTON BARRON is a 66 year old woman who presents with + RF 19 checked in setting of joint pain as below-\par \par + L knee chronic meniscal injury, s/p gel 5 years ago which helped until recently, pulling in popliteal space, worst in the morning, ROM is ok, R knee not affected \par + dull achy pain in b/l thighs at nighttime chronically, linked to LBP, improved with tylenol and heating pad\par + LBP with cold weather, no sciatica, known DJD \par + chronic L shoulder impingement syndrome -- self resolved \par \par + IBS\par + fatty liver \par \par Inflammatory arthritis ROS negative for symmetrical peripheral joint synovitis, prolonged AM stiffness, enthesitis, dactylitis, psoriasis/ rashes, eye inflammation, inflammatory low back pain, IBD. \par \par SLE ROS negative for alopecia, sicca, salivary gland swelling, oral ulcers, malar rash, photosensitivity, SOB, chest pain, serositis, abd pain, dysuria, hematuria, rash, joint AM stiffness/synovitis, hematologic abnormalities, Raynauds. APLS ROS -  no thrombotic events. \par \par Labs - Neg OLMAN, dsDNA, CCP\par Repeat labs Aug 2022 - remains with low + RF, negative CCP/ESR/CRP \par -----\par 8/26/22 -- 2 weeks of worsening L posterior knee pain, markedly worse over this recent weekend, cramping and radiation down leg. Taking Advil and using heating pad which is helping, down from 10/10 to 3/10 at present. LBP stable, not worse over same time period, remainder of joints quiescent. \par \par 10/27/22 -- Started PT with Ortho is helping her knee, still some activity mainly with stairs, no swelling.  Bracing during prolonged ambulation is helping.  Intermittent 5 minutes of left ankle pain, then self resolves, no swelling.  LBP unchanged.  Inflammatory arthritis ROS negative.

## 2022-10-31 LAB
25(OH)D3 SERPL-MCNC: 33.1 NG/ML
ALBUMIN SERPL ELPH-MCNC: 4.5 G/DL
ALP BLD-CCNC: 54 U/L
ALT SERPL-CCNC: 20 U/L
ANION GAP SERPL CALC-SCNC: 11 MMOL/L
APPEARANCE: ABNORMAL
AST SERPL-CCNC: 15 U/L
BACTERIA: NEGATIVE
BASOPHILS # BLD AUTO: 0.04 K/UL
BASOPHILS NFR BLD AUTO: 0.6 %
BILIRUB SERPL-MCNC: 0.4 MG/DL
BILIRUBIN URINE: NEGATIVE
BLOOD URINE: NEGATIVE
BUN SERPL-MCNC: 18 MG/DL
CALCIUM SERPL-MCNC: 9.9 MG/DL
CHLORIDE SERPL-SCNC: 104 MMOL/L
CHOLEST SERPL-MCNC: 191 MG/DL
CO2 SERPL-SCNC: 25 MMOL/L
COLOR: NORMAL
COVID-19 NUCLEOCAPSID  GAM ANTIBODY INTERPRETATION: POSITIVE
COVID-19 SPIKE DOMAIN ANTIBODY INTERPRETATION: POSITIVE
CREAT SERPL-MCNC: 0.83 MG/DL
EGFR: 77 ML/MIN/1.73M2
EOSINOPHIL # BLD AUTO: 0.2 K/UL
EOSINOPHIL NFR BLD AUTO: 2.9 %
ESTIMATED AVERAGE GLUCOSE: 111 MG/DL
FOLATE SERPL-MCNC: 11.6 NG/ML
GLUCOSE QUALITATIVE U: NEGATIVE
GLUCOSE SERPL-MCNC: 107 MG/DL
HBA1C MFR BLD HPLC: 5.5 %
HCT VFR BLD CALC: 46 %
HDLC SERPL-MCNC: 46 MG/DL
HGB BLD-MCNC: 15.3 G/DL
HYALINE CASTS: 1 /LPF
IMM GRANULOCYTES NFR BLD AUTO: 0.1 %
KETONES URINE: NEGATIVE
LDLC SERPL CALC-MCNC: 118 MG/DL
LEUKOCYTE ESTERASE URINE: ABNORMAL
LYMPHOCYTES # BLD AUTO: 1.47 K/UL
LYMPHOCYTES NFR BLD AUTO: 21 %
MAN DIFF?: NORMAL
MCHC RBC-ENTMCNC: 28.8 PG
MCHC RBC-ENTMCNC: 33.3 GM/DL
MCV RBC AUTO: 86.5 FL
MICROSCOPIC-UA: NORMAL
MONOCYTES # BLD AUTO: 0.45 K/UL
MONOCYTES NFR BLD AUTO: 6.4 %
NEUTROPHILS # BLD AUTO: 4.82 K/UL
NEUTROPHILS NFR BLD AUTO: 69 %
NITRITE URINE: NEGATIVE
NONHDLC SERPL-MCNC: 145 MG/DL
PH URINE: 5.5
PLATELET # BLD AUTO: 253 K/UL
POTASSIUM SERPL-SCNC: 4.4 MMOL/L
PROT SERPL-MCNC: 7 G/DL
PROTEIN URINE: NORMAL
RBC # BLD: 5.32 M/UL
RBC # FLD: 13.3 %
RED BLOOD CELLS URINE: 2 /HPF
SARS-COV-2 AB SERPL IA-ACNC: >250 U/ML
SARS-COV-2 AB SERPL QL IA: 7.08 INDEX
SODIUM SERPL-SCNC: 140 MMOL/L
SPECIFIC GRAVITY URINE: 1.02
SQUAMOUS EPITHELIAL CELLS: 6 /HPF
TRIGL SERPL-MCNC: 135 MG/DL
TSH SERPL-ACNC: 2.3 UIU/ML
UROBILINOGEN URINE: NORMAL
VIT B12 SERPL-MCNC: 350 PG/ML
WBC # FLD AUTO: 6.99 K/UL
WHITE BLOOD CELLS URINE: 16 /HPF

## 2022-11-04 ENCOUNTER — OUTPATIENT (OUTPATIENT)
Dept: OUTPATIENT SERVICES | Facility: HOSPITAL | Age: 67
LOS: 1 days | End: 2022-11-04
Payer: MEDICARE

## 2022-11-04 ENCOUNTER — APPOINTMENT (OUTPATIENT)
Dept: MAMMOGRAPHY | Facility: HOSPITAL | Age: 67
End: 2022-11-04

## 2022-11-04 DIAGNOSIS — Z00.8 ENCOUNTER FOR OTHER GENERAL EXAMINATION: ICD-10-CM

## 2022-11-04 PROCEDURE — 77063 BREAST TOMOSYNTHESIS BI: CPT

## 2022-11-04 PROCEDURE — 77067 SCR MAMMO BI INCL CAD: CPT | Mod: 26

## 2022-11-04 PROCEDURE — 77067 SCR MAMMO BI INCL CAD: CPT

## 2022-11-04 PROCEDURE — 77063 BREAST TOMOSYNTHESIS BI: CPT | Mod: 26

## 2022-11-11 ENCOUNTER — NON-APPOINTMENT (OUTPATIENT)
Age: 67
End: 2022-11-11

## 2022-11-12 ENCOUNTER — APPOINTMENT (OUTPATIENT)
Dept: INTERNAL MEDICINE | Facility: CLINIC | Age: 67
End: 2022-11-12

## 2022-11-30 ENCOUNTER — APPOINTMENT (OUTPATIENT)
Dept: INTERNAL MEDICINE | Facility: CLINIC | Age: 67
End: 2022-11-30

## 2022-11-30 DIAGNOSIS — J01.90 ACUTE SINUSITIS, UNSPECIFIED: ICD-10-CM

## 2022-11-30 PROCEDURE — 99212 OFFICE O/P EST SF 10 MIN: CPT | Mod: 95

## 2022-11-30 RX ORDER — TIZANIDINE 4 MG/1
4 TABLET ORAL
Qty: 14 | Refills: 0 | Status: DISCONTINUED | COMMUNITY
Start: 2022-08-29 | End: 2022-11-30

## 2022-11-30 RX ORDER — CHROMIUM 200 MCG
TABLET ORAL
Refills: 0 | Status: DISCONTINUED | COMMUNITY
End: 2022-11-30

## 2022-11-30 RX ORDER — MELOXICAM 15 MG/1
15 TABLET ORAL DAILY
Qty: 14 | Refills: 0 | Status: DISCONTINUED | COMMUNITY
Start: 2022-08-24 | End: 2022-11-30

## 2022-11-30 RX ORDER — LIDOCAINE 5% 700 MG/1
5 PATCH TOPICAL
Qty: 14 | Refills: 0 | Status: DISCONTINUED | COMMUNITY
Start: 2022-08-24 | End: 2022-11-30

## 2022-11-30 RX ORDER — METHYLPREDNISOLONE 4 MG/1
4 TABLET ORAL
Qty: 1 | Refills: 0 | Status: DISCONTINUED | COMMUNITY
Start: 2022-09-01 | End: 2022-11-30

## 2022-11-30 NOTE — HISTORY OF PRESENT ILLNESS
[FreeTextEntry8] : This visit was provided via telehealth using real time 2-way audio/visual technology. The patient, HOUSTON BARRON was located at home, [address] [place] [address], at the time of the visit. The patient, HOUSTON BARRON and Provider participated in the telehealth encounter. The patient HOUSTON BARRON  provided verbal consent for this telehealth encounter.\par \par Patient is a 63 year old female who presents with complaints of nasal congestion, sinus pressure, sore throat for the past week. She has not had any associated fevers but has had some chills. She states she feels her symptoms are worsening, especially the sinus pressure, despite conservative measures. She has taken multiple negative home COVID tests and had a negative COVID PCR done earlier this week. \par

## 2022-11-30 NOTE — PLAN
[FreeTextEntry1] : Symptoms of nasal congestion and sinus pressure ongoing for a week, worsening in nature \par Given chronicity and worsening of symptoms, start Augmentin BID for 10 days \par Continue supportive measures, rest, fluids, Tylenol as needed. \par RTO if symptoms do not improve \par \par I have spent 15 minutes on this encounter.

## 2022-12-15 RX ORDER — AMOXICILLIN AND CLAVULANATE POTASSIUM 875; 125 MG/1; MG/1
875-125 TABLET, COATED ORAL
Qty: 20 | Refills: 0 | Status: DISCONTINUED | COMMUNITY
Start: 2022-11-30 | End: 2022-12-15

## 2022-12-16 ENCOUNTER — APPOINTMENT (OUTPATIENT)
Dept: INTERNAL MEDICINE | Facility: CLINIC | Age: 67
End: 2022-12-16

## 2022-12-16 VITALS
WEIGHT: 200 LBS | DIASTOLIC BLOOD PRESSURE: 84 MMHG | HEART RATE: 86 BPM | OXYGEN SATURATION: 97 % | RESPIRATION RATE: 14 BRPM | SYSTOLIC BLOOD PRESSURE: 128 MMHG | TEMPERATURE: 97.9 F | HEIGHT: 65 IN | BODY MASS INDEX: 33.32 KG/M2

## 2022-12-16 PROCEDURE — 99214 OFFICE O/P EST MOD 30 MIN: CPT

## 2022-12-16 NOTE — HEALTH RISK ASSESSMENT
[No falls in past year] : Patient reported no falls in the past year [0] : 2) Feeling down, depressed, or hopeless: Not at all (0) [PHQ-2 Negative - No further assessment needed] : PHQ-2 Negative - No further assessment needed

## 2023-01-04 ENCOUNTER — TRANSCRIPTION ENCOUNTER (OUTPATIENT)
Age: 68
End: 2023-01-04

## 2023-01-06 ENCOUNTER — TRANSCRIPTION ENCOUNTER (OUTPATIENT)
Age: 68
End: 2023-01-06

## 2023-03-03 ENCOUNTER — APPOINTMENT (OUTPATIENT)
Dept: INTERNAL MEDICINE | Facility: CLINIC | Age: 68
End: 2023-03-03
Payer: MEDICARE

## 2023-03-03 DIAGNOSIS — F41.9 ANXIETY DISORDER, UNSPECIFIED: ICD-10-CM

## 2023-03-03 DIAGNOSIS — G47.00 INSOMNIA, UNSPECIFIED: ICD-10-CM

## 2023-03-03 PROCEDURE — 99213 OFFICE O/P EST LOW 20 MIN: CPT | Mod: 95

## 2023-03-03 NOTE — PHYSICAL EXAM
[Normal] : no acute distress, well nourished, well developed and well-appearing [No Respiratory Distress] : no respiratory distress  [No Accessory Muscle Use] : no accessory muscle use [Alert and Oriented x3] : oriented to person, place, and time

## 2023-03-03 NOTE — PLAN
[FreeTextEntry1] : valium low dose PRN \par 20 tabs \par to be used sparingly \par do not combine with other sedatives

## 2023-03-03 NOTE — HISTORY OF PRESENT ILLNESS
[Home] : at home, [unfilled] , at the time of the visit. [Medical Office: ([facility])___] : at the medical office located in  [Verbal consent obtained from patient] : the patient, [unfilled] [de-identified] : Pt here with complaint of anxiety and insomnia. She is dealing with several stressors, her daughter is in the hospital. She was given trazodone but it did not 'agree with her.' She has tried melatonin which did not help. She would like valium to take sparingly for acute episodes of anxiety and insomnia.

## 2023-05-30 ENCOUNTER — APPOINTMENT (OUTPATIENT)
Dept: INTERNAL MEDICINE | Facility: CLINIC | Age: 68
End: 2023-05-30
Payer: MEDICARE

## 2023-05-30 VITALS
SYSTOLIC BLOOD PRESSURE: 132 MMHG | DIASTOLIC BLOOD PRESSURE: 74 MMHG | RESPIRATION RATE: 16 BRPM | TEMPERATURE: 98.2 F | OXYGEN SATURATION: 97 % | HEIGHT: 65 IN | HEART RATE: 82 BPM

## 2023-05-30 DIAGNOSIS — K76.0 FATTY (CHANGE OF) LIVER, NOT ELSEWHERE CLASSIFIED: ICD-10-CM

## 2023-05-30 DIAGNOSIS — R76.8 OTHER SPECIFIED ABNORMAL IMMUNOLOGICAL FINDINGS IN SERUM: ICD-10-CM

## 2023-05-30 DIAGNOSIS — Z00.00 ENCOUNTER FOR GENERAL ADULT MEDICAL EXAMINATION W/OUT ABNORMAL FINDINGS: ICD-10-CM

## 2023-05-30 DIAGNOSIS — R22.1 LOCALIZED SWELLING, MASS AND LUMP, NECK: ICD-10-CM

## 2023-05-30 PROCEDURE — G0439: CPT

## 2023-05-30 RX ORDER — DIAZEPAM 2 MG/1
2 TABLET ORAL DAILY
Qty: 20 | Refills: 0 | Status: DISCONTINUED | COMMUNITY
Start: 2023-03-03 | End: 2023-05-30

## 2023-05-30 RX ORDER — TRAZODONE HYDROCHLORIDE 50 MG/1
50 TABLET ORAL
Qty: 30 | Refills: 1 | Status: DISCONTINUED | COMMUNITY
Start: 2022-12-16 | End: 2023-05-30

## 2023-05-30 NOTE — PHYSICAL EXAM
[PERRL] : pupils equal round and reactive to light [EOMI] : extraocular movements intact [Normal Oropharynx] : the oropharynx was normal [No Lymphadenopathy] : no lymphadenopathy [Thyroid Normal, No Nodules] : the thyroid was normal and there were no nodules present [Soft] : abdomen soft [Non Tender] : non-tender [Non-distended] : non-distended [Normal Bowel Sounds] : normal bowel sounds [Normal] : affect was normal and insight and judgment were intact [de-identified] : small lump noted at level of manubrium

## 2023-05-30 NOTE — HEALTH RISK ASSESSMENT
[Good] : ~his/her~  mood as  good [No] : In the past 12 months have you used drugs other than those required for medical reasons? No [0] : 2) Feeling down, depressed, or hopeless: Not at all (0) [PHQ-2 Negative - No further assessment needed] : PHQ-2 Negative - No further assessment needed [Patient reported mammogram was normal] : Patient reported mammogram was normal [Patient reported PAP Smear was normal] : Patient reported PAP Smear was normal [Patient reported colonoscopy was normal] : Patient reported colonoscopy was normal [With Family] : lives with family [Retired] : retired [Feels Safe at Home] : Feels safe at home [Fully functional (bathing, dressing, toileting, transferring, walking, feeding)] : Fully functional (bathing, dressing, toileting, transferring, walking, feeding) [Fully functional (using the telephone, shopping, preparing meals, housekeeping, doing laundry, using] : Fully functional and needs no help or supervision to perform IADLs (using the telephone, shopping, preparing meals, housekeeping, doing laundry, using transportation, managing medications and managing finances) [Reports normal functional visual acuity (ie: able to read med bottle)] : Reports normal functional visual acuity [Smoke Detector] : smoke detector [Seat Belt] :  uses seat belt [Never] : Never [Audit-CScore] : 0 [CFU5Lefdy] : 0 [Reports changes in hearing] : Reports no changes in hearing [Reports changes in vision] : Reports no changes in vision [Reports changes in dental health] : Reports no changes in dental health [MammogramDate] : 04/22 [PapSmearDate] : 04/22 [ColonoscopyDate] : 01/15

## 2023-05-30 NOTE — HISTORY OF PRESENT ILLNESS
[FreeTextEntry1] : JUDY  [de-identified] : 67 year old female with history of NAFLD who presents today for CPE. She is doing well. She has occasional joint pains; had mild elevated rheumatoid factor a few years ago. She went to a rheumatologist and was told that it was only mildly elevated so she was told to monitor. Would like to get level checked today. \par \par Patient also noticed small "lump" and the base of her throat. It has been there for months and does not bother her. \par \par Liver: Dr Goodson \par Cardio: Dr Valentino

## 2023-06-02 LAB
ALBUMIN SERPL ELPH-MCNC: 4.9 G/DL
ALP BLD-CCNC: 57 U/L
ALT SERPL-CCNC: 18 U/L
ANION GAP SERPL CALC-SCNC: 12 MMOL/L
APPEARANCE: CLEAR
AST SERPL-CCNC: 16 U/L
BACTERIA: NEGATIVE /HPF
BILIRUB SERPL-MCNC: 0.6 MG/DL
BILIRUBIN URINE: NEGATIVE
BLOOD URINE: NEGATIVE
BUN SERPL-MCNC: 16 MG/DL
CALCIUM SERPL-MCNC: 10.3 MG/DL
CAST: 0 /LPF
CHLORIDE SERPL-SCNC: 106 MMOL/L
CHOLEST SERPL-MCNC: 213 MG/DL
CO2 SERPL-SCNC: 24 MMOL/L
COLOR: YELLOW
COVID-19 NUCLEOCAPSID  GAM ANTIBODY INTERPRETATION: POSITIVE
COVID-19 SPIKE DOMAIN ANTIBODY INTERPRETATION: POSITIVE
CREAT SERPL-MCNC: 0.92 MG/DL
EGFR: 68 ML/MIN/1.73M2
EPITHELIAL CELLS: 6 /HPF
ESTIMATED AVERAGE GLUCOSE: 117 MG/DL
FOLATE SERPL-MCNC: 11.7 NG/ML
GLUCOSE QUALITATIVE U: NEGATIVE MG/DL
GLUCOSE SERPL-MCNC: 101 MG/DL
HBA1C MFR BLD HPLC: 5.7 %
HCT VFR BLD CALC: 46.6 %
HCT VFR BLD CALC: 47.5 %
HDLC SERPL-MCNC: 47 MG/DL
HGB BLD-MCNC: 15.4 G/DL
HGB BLD-MCNC: 15.5 G/DL
KETONES URINE: NEGATIVE MG/DL
LDLC SERPL CALC-MCNC: 131 MG/DL
LEUKOCYTE ESTERASE URINE: ABNORMAL
MCHC RBC-ENTMCNC: 28.5 PG
MCHC RBC-ENTMCNC: 28.6 PG
MCHC RBC-ENTMCNC: 32.6 GM/DL
MCHC RBC-ENTMCNC: 33 GM/DL
MCV RBC AUTO: 86.1 FL
MCV RBC AUTO: 87.6 FL
MICROSCOPIC-UA: NORMAL
NITRITE URINE: NEGATIVE
NONHDLC SERPL-MCNC: 167 MG/DL
PH URINE: 5.5
PLATELET # BLD AUTO: 249 K/UL
PLATELET # BLD AUTO: 264 K/UL
POTASSIUM SERPL-SCNC: 4.9 MMOL/L
PROT SERPL-MCNC: 7.3 G/DL
PROTEIN URINE: NEGATIVE MG/DL
RBC # BLD: 5.41 M/UL
RBC # BLD: 5.42 M/UL
RBC # FLD: 13.5 %
RBC # FLD: 13.6 %
RED BLOOD CELLS URINE: 2 /HPF
RHEUMATOID FACT SER QL: 18 IU/ML
RHEUMATOID FACT SER QL: 18 IU/ML
SARS-COV-2 AB SERPL IA-ACNC: >250 U/ML
SARS-COV-2 AB SERPL QL IA: 5.84 INDEX
SODIUM SERPL-SCNC: 142 MMOL/L
SPECIFIC GRAVITY URINE: 1.02
TRIGL SERPL-MCNC: 179 MG/DL
TSH SERPL-ACNC: 1.78 UIU/ML
UROBILINOGEN URINE: 0.2 MG/DL
VIT B12 SERPL-MCNC: 399 PG/ML
WBC # FLD AUTO: 6.62 K/UL
WBC # FLD AUTO: 6.89 K/UL
WHITE BLOOD CELLS URINE: 7 /HPF

## 2023-06-08 ENCOUNTER — OUTPATIENT (OUTPATIENT)
Dept: OUTPATIENT SERVICES | Facility: HOSPITAL | Age: 68
LOS: 1 days | End: 2023-06-08
Payer: MEDICARE

## 2023-06-08 ENCOUNTER — APPOINTMENT (OUTPATIENT)
Dept: RADIOLOGY | Facility: CLINIC | Age: 68
End: 2023-06-08
Payer: MEDICARE

## 2023-06-08 ENCOUNTER — APPOINTMENT (OUTPATIENT)
Dept: ULTRASOUND IMAGING | Facility: CLINIC | Age: 68
End: 2023-06-08
Payer: MEDICARE

## 2023-06-08 DIAGNOSIS — R22.1 LOCALIZED SWELLING, MASS AND LUMP, NECK: ICD-10-CM

## 2023-06-08 DIAGNOSIS — Z00.00 ENCOUNTER FOR GENERAL ADULT MEDICAL EXAMINATION WITHOUT ABNORMAL FINDINGS: ICD-10-CM

## 2023-06-08 PROCEDURE — 77085 DXA BONE DENSITY AXL VRT FX: CPT

## 2023-06-08 PROCEDURE — 77085 DXA BONE DENSITY AXL VRT FX: CPT | Mod: 26

## 2023-06-08 PROCEDURE — 76536 US EXAM OF HEAD AND NECK: CPT | Mod: 26

## 2023-06-08 PROCEDURE — 76536 US EXAM OF HEAD AND NECK: CPT

## 2023-06-20 ENCOUNTER — APPOINTMENT (OUTPATIENT)
Dept: INTERNAL MEDICINE | Facility: CLINIC | Age: 68
End: 2023-06-20
Payer: MEDICARE

## 2023-06-20 VITALS
HEIGHT: 65 IN | HEART RATE: 84 BPM | TEMPERATURE: 98.3 F | RESPIRATION RATE: 16 BRPM | SYSTOLIC BLOOD PRESSURE: 142 MMHG | OXYGEN SATURATION: 98 % | DIASTOLIC BLOOD PRESSURE: 88 MMHG

## 2023-06-20 DIAGNOSIS — S13.4XXA SPRAIN OF LIGAMENTS OF CERVICAL SPINE, INITIAL ENCOUNTER: ICD-10-CM

## 2023-06-20 DIAGNOSIS — M25.562 PAIN IN LEFT KNEE: ICD-10-CM

## 2023-06-20 PROCEDURE — 99213 OFFICE O/P EST LOW 20 MIN: CPT

## 2023-06-20 NOTE — PHYSICAL EXAM
[Normal Sclera/Conjunctiva] : normal sclera/conjunctiva [PERRL] : pupils equal round and reactive to light [No Lymphadenopathy] : no lymphadenopathy [Coordination Grossly Intact] : coordination grossly intact [No Focal Deficits] : no focal deficits [Normal Gait] : normal gait [Normal] : affect was normal and insight and judgment were intact [de-identified] : mild tenderness to palpation of paraspinal muscles of cervical spine  [de-identified] : left knee with mild superficial swelling and tenderness to palpation, ROM intact, no bruising noted
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
## 2023-06-29 ENCOUNTER — OUTPATIENT (OUTPATIENT)
Dept: OUTPATIENT SERVICES | Facility: HOSPITAL | Age: 68
LOS: 1 days | End: 2023-06-29
Payer: COMMERCIAL

## 2023-06-29 ENCOUNTER — RESULT REVIEW (OUTPATIENT)
Age: 68
End: 2023-06-29

## 2023-06-29 ENCOUNTER — APPOINTMENT (OUTPATIENT)
Dept: RADIOLOGY | Facility: HOSPITAL | Age: 68
End: 2023-06-29
Payer: COMMERCIAL

## 2023-06-29 DIAGNOSIS — S13.4XXA SPRAIN OF LIGAMENTS OF CERVICAL SPINE, INITIAL ENCOUNTER: ICD-10-CM

## 2023-06-29 PROCEDURE — 73562 X-RAY EXAM OF KNEE 3: CPT

## 2023-06-29 PROCEDURE — 73562 X-RAY EXAM OF KNEE 3: CPT | Mod: 26,LT

## 2023-07-13 ENCOUNTER — APPOINTMENT (OUTPATIENT)
Dept: ORTHOPEDIC SURGERY | Facility: CLINIC | Age: 68
End: 2023-07-13
Payer: COMMERCIAL

## 2023-07-13 VITALS — BODY MASS INDEX: 33.32 KG/M2 | WEIGHT: 200 LBS | HEIGHT: 65 IN

## 2023-07-13 DIAGNOSIS — M17.12 UNILATERAL PRIMARY OSTEOARTHRITIS, LEFT KNEE: ICD-10-CM

## 2023-07-13 PROCEDURE — 99213 OFFICE O/P EST LOW 20 MIN: CPT

## 2023-07-14 PROBLEM — M17.12 PRIMARY OSTEOARTHRITIS OF LEFT KNEE: Status: ACTIVE | Noted: 2022-03-16

## 2023-09-04 ENCOUNTER — NON-APPOINTMENT (OUTPATIENT)
Age: 68
End: 2023-09-04

## 2023-09-08 ENCOUNTER — APPOINTMENT (OUTPATIENT)
Dept: INTERNAL MEDICINE | Facility: CLINIC | Age: 68
End: 2023-09-08
Payer: MEDICARE

## 2023-09-08 DIAGNOSIS — U07.1 COVID-19: ICD-10-CM

## 2023-09-08 PROCEDURE — 99214 OFFICE O/P EST MOD 30 MIN: CPT | Mod: 95

## 2023-09-08 NOTE — PLAN
[FreeTextEntry1] : COVID - Symptoms are improving  - Patient would like to hold off on Paxlovid, advised she should take it today or tomorrow for therapeutic benefit if her symptoms worsen. - Continue supportive measures.

## 2023-09-08 NOTE — HISTORY OF PRESENT ILLNESS
[FreeTextEntry8] : This visit was provided via telehealth using real time 2-way audio/visual technology. The patient, HOUSTON BARRON was [place], [address] [place] [address], at the time of the visit. The patient, HOUSTON BARRON and Provider participated in the telehealth encounter. The patient HOUSTON BARRON  provided verbal consent for this telehealth encounter.  67 year old female who tested positive for COVID on Tuesday. Symptoms started on Monday. She has congestion, cough, headache. Symptoms are improving. She was prescribed Paxlovid by urgent care but has not taken it out of concern for side effects. She denies chest pain, palpitations, SOB.

## 2023-09-08 NOTE — END OF VISIT
[Time Spent: ___ minutes] : I have spent [unfilled] minutes of time on the encounter. 05-Oct-2017 22-Dec-2022 14:04

## 2023-11-06 ENCOUNTER — OUTPATIENT (OUTPATIENT)
Dept: OUTPATIENT SERVICES | Facility: HOSPITAL | Age: 68
LOS: 1 days | End: 2023-11-06
Payer: MEDICARE

## 2023-11-06 ENCOUNTER — APPOINTMENT (OUTPATIENT)
Dept: MAMMOGRAPHY | Facility: HOSPITAL | Age: 68
End: 2023-11-06
Payer: MEDICARE

## 2023-11-06 DIAGNOSIS — Z00.8 ENCOUNTER FOR OTHER GENERAL EXAMINATION: ICD-10-CM

## 2023-11-06 PROCEDURE — 77063 BREAST TOMOSYNTHESIS BI: CPT | Mod: 26

## 2023-11-06 PROCEDURE — 77063 BREAST TOMOSYNTHESIS BI: CPT

## 2023-11-06 PROCEDURE — 77067 SCR MAMMO BI INCL CAD: CPT

## 2023-11-06 PROCEDURE — 77067 SCR MAMMO BI INCL CAD: CPT | Mod: 26

## 2023-11-09 ENCOUNTER — APPOINTMENT (OUTPATIENT)
Dept: ULTRASOUND IMAGING | Facility: HOSPITAL | Age: 68
End: 2023-11-09

## 2023-11-20 ENCOUNTER — OUTPATIENT (OUTPATIENT)
Dept: OUTPATIENT SERVICES | Facility: HOSPITAL | Age: 68
LOS: 1 days | End: 2023-11-20
Payer: MEDICARE

## 2023-11-20 ENCOUNTER — APPOINTMENT (OUTPATIENT)
Dept: ULTRASOUND IMAGING | Facility: HOSPITAL | Age: 68
End: 2023-11-20
Payer: MEDICARE

## 2023-11-20 DIAGNOSIS — N84.0 POLYP OF CORPUS UTERI: ICD-10-CM

## 2023-11-20 PROCEDURE — 76856 US EXAM PELVIC COMPLETE: CPT | Mod: 26

## 2023-11-20 PROCEDURE — 76830 TRANSVAGINAL US NON-OB: CPT

## 2023-11-20 PROCEDURE — 76830 TRANSVAGINAL US NON-OB: CPT | Mod: 26

## 2023-11-20 PROCEDURE — 76856 US EXAM PELVIC COMPLETE: CPT

## 2023-11-24 ENCOUNTER — APPOINTMENT (OUTPATIENT)
Dept: ULTRASOUND IMAGING | Facility: HOSPITAL | Age: 68
End: 2023-11-24

## 2024-05-07 ENCOUNTER — NON-APPOINTMENT (OUTPATIENT)
Age: 69
End: 2024-05-07

## 2024-11-01 ENCOUNTER — NON-APPOINTMENT (OUTPATIENT)
Age: 69
End: 2024-11-01

## 2024-11-07 ENCOUNTER — APPOINTMENT (OUTPATIENT)
Dept: MAMMOGRAPHY | Facility: HOSPITAL | Age: 69
End: 2024-11-07
Payer: MEDICARE

## 2024-11-07 ENCOUNTER — OUTPATIENT (OUTPATIENT)
Dept: OUTPATIENT SERVICES | Facility: HOSPITAL | Age: 69
LOS: 1 days | End: 2024-11-07
Payer: MEDICARE

## 2024-11-07 DIAGNOSIS — Z12.31 ENCOUNTER FOR SCREENING MAMMOGRAM FOR MALIGNANT NEOPLASM OF BREAST: ICD-10-CM

## 2024-11-07 PROCEDURE — 77067 SCR MAMMO BI INCL CAD: CPT | Mod: 26

## 2024-11-07 PROCEDURE — 77063 BREAST TOMOSYNTHESIS BI: CPT | Mod: 26

## 2024-11-20 PROCEDURE — 77067 SCR MAMMO BI INCL CAD: CPT

## 2024-11-20 PROCEDURE — 77063 BREAST TOMOSYNTHESIS BI: CPT

## 2025-04-22 ENCOUNTER — NON-APPOINTMENT (OUTPATIENT)
Age: 70
End: 2025-04-22

## 2025-04-22 ENCOUNTER — APPOINTMENT (OUTPATIENT)
Dept: RADIOLOGY | Facility: HOSPITAL | Age: 70
End: 2025-04-22
Payer: MEDICARE

## 2025-04-22 ENCOUNTER — OUTPATIENT (OUTPATIENT)
Dept: OUTPATIENT SERVICES | Facility: HOSPITAL | Age: 70
LOS: 1 days | End: 2025-04-22
Payer: MEDICARE

## 2025-04-22 ENCOUNTER — EMERGENCY (EMERGENCY)
Facility: HOSPITAL | Age: 70
LOS: 1 days | End: 2025-04-22
Attending: EMERGENCY MEDICINE | Admitting: EMERGENCY MEDICINE
Payer: MEDICARE

## 2025-04-22 VITALS
OXYGEN SATURATION: 95 % | HEART RATE: 91 BPM | DIASTOLIC BLOOD PRESSURE: 94 MMHG | TEMPERATURE: 98 F | HEIGHT: 65 IN | SYSTOLIC BLOOD PRESSURE: 177 MMHG | WEIGHT: 199.96 LBS | RESPIRATION RATE: 18 BRPM

## 2025-04-22 DIAGNOSIS — M25.512 PAIN IN LEFT SHOULDER: ICD-10-CM

## 2025-04-22 PROCEDURE — 73564 X-RAY EXAM KNEE 4 OR MORE: CPT | Mod: 26,LT

## 2025-04-22 PROCEDURE — 73200 CT UPPER EXTREMITY W/O DYE: CPT | Mod: 26,LT,76

## 2025-04-22 PROCEDURE — 73030 X-RAY EXAM OF SHOULDER: CPT

## 2025-04-22 PROCEDURE — 99284 EMERGENCY DEPT VISIT MOD MDM: CPT | Mod: 25

## 2025-04-22 PROCEDURE — 72125 CT NECK SPINE W/O DYE: CPT | Mod: 26

## 2025-04-22 PROCEDURE — 73564 X-RAY EXAM KNEE 4 OR MORE: CPT

## 2025-04-22 PROCEDURE — 73030 X-RAY EXAM OF SHOULDER: CPT | Mod: 26,LT

## 2025-04-22 PROCEDURE — 73200 CT UPPER EXTREMITY W/O DYE: CPT | Mod: MC

## 2025-04-22 PROCEDURE — 99284 EMERGENCY DEPT VISIT MOD MDM: CPT

## 2025-04-22 PROCEDURE — 72125 CT NECK SPINE W/O DYE: CPT | Mod: MC

## 2025-04-22 NOTE — ED PROVIDER NOTE - PHYSICAL EXAMINATION
Constitutional: NAD AAOx3, gcs 15  Eyes: EOMI, pupils equal round reactive  Head: Normocephalic atraumatic  Mouth: no airway obstruction  Cardiac: regular rate   Resp: Lungs CTAB  GI: Abd s/nt/nd  Neuro: CN2-12 intact  Skin: No rashes  MSK: L acromion TTP, L humeral and forearm TTP, radial pulse 2+, soft compartments, wrist and fingers nontender.   L knee: patellar TTP, no ligament laxity, no popliteal TTP. moving other extremities spont and without pain, all NVI with 2+ pulses

## 2025-04-22 NOTE — ED ADULT TRIAGE NOTE - NSWEIGHTCALCTOOLDRUG_GEN_A_CORE
Discharge instructions reviewed with patient/family, copy given.  Discharged per mother's arms to car accompanied by family.    used

## 2025-04-22 NOTE — ED PROVIDER NOTE - CARE PLAN
1 Principal Discharge DX:	Fall  Secondary Diagnosis:	Pain of left upper extremity  Secondary Diagnosis:	Left patella fracture

## 2025-04-22 NOTE — ED PROVIDER NOTE - PATIENT PORTAL LINK FT
You can access the FollowMyHealth Patient Portal offered by [facility] by registering at the following website: [email or web address]. By joining [employer]’s FollowMyHealth portal, you will also be able to view your health information using other applications (apps) compatible with our system.

## 2025-04-22 NOTE — ED PROVIDER NOTE - CLINICAL SUMMARY MEDICAL DECISION MAKING FREE TEXT BOX
CT C spine, CT L shoulder, CT LUE, XR L knee  advised CTH; pt wants to skip CTH, mantains capacity. CT C spine, CT L shoulder, CT LUE, XR L knee  advised CTH; pt wants to skip CTH, mantains capacity.     CTs without traumatic findings.      XR left knee with possible patellar chip fracture with change from last XR taken in 2023.  Will have patient follow-up with Ortho, knee immobilizer, crutches, strict turn precautions.

## 2025-04-22 NOTE — ED PROVIDER NOTE - NSFOLLOWUPINSTRUCTIONS_ED_ALL_ED_FT
as discussed, follow up with ortho surgery for your left arm pain and possible left patella chip fracture.  return if any new or worsening symptoms    You can hurt your shoulder by using it too much during an activity, such as fishing or baseball. It can also happen as part of the everyday wear and tear of getting older. Shoulder injuries can be slow to heal, but your shoulder should get better with time.    Your doctor may recommend a sling to rest your shoulder. If you have injured your shoulder, you may need testing and treatment.    Follow-up care is a key part of your treatment and safety. Be sure to make and go to all appointments, and call your doctor or nurse advice line (108 in most provinces and territories) if you are having problems. It's also a good idea to know your test results and keep a list of the medicines you take.    How can you care for yourself at home?  Take pain medicines exactly as directed.  If the doctor gave you a prescription medicine for pain, take it as prescribed.  If you are not taking a prescription pain medicine, ask your doctor if you can take an over-the-counter medicine.  Do not take two or more pain medicines at the same time unless the doctor told you to. Many pain medicines contain acetaminophen, which is Tylenol. Too much acetaminophen (Tylenol) can be harmful.  If your doctor recommends that you wear a sling, use it as directed. Do not take it off before your doctor tells you to.  Put ice or a cold pack on the sore area for 10 to 20 minutes at a time. Put a thin cloth between the ice and your skin.  If there is no swelling, you can put moist heat, a heating pad, or a warm cloth on your shoulder. Some doctors suggest alternating between hot and cold.  Rest your shoulder for a few days. If your doctor recommends it, you can then begin gentle exercise of the shoulder, but do not lift anything heavy.  When should you call for help?  	  Call 911 anytime you think you may need emergency care. For example, call if:    You have chest pain or pressure. This may occur with:  Sweating.  Shortness of breath.  Nausea or vomiting.  Pain that spreads from the chest to the neck, jaw, or one or both shoulders or arms.  Dizziness or light-headedness.  A fast or uneven pulse.  After calling 911, chew 1 adult-strength aspirin. Wait for an ambulance. Do not try to drive yourself.  Your arm or hand is cool or pale or changes colour.  Call your doctor or nurse advice line now or seek immediate medical care if:    You have signs of infection, such as:  Increased pain, swelling, warmth, or redness in your shoulder.  Red streaks leading from a place on your shoulder.  Pus draining from an area of your shoulder.  Swollen lymph nodes in your neck, armpits, or groin.  A fever.  Watch closely for changes in your health, and be sure to contact your doctor or nurse advice line if:    You cannot use your shoulder.  Your shoulder does not get better as expected    The kneecap (patella) is a bone that protects the front of your knee joint. It takes the brunt of blows to your knee, such as a fall onto a knee or a knee hitting the dashboard. A broken kneecap (fracture) causes swelling and pain, especially when moving the knee back and forth.    You may not need surgery if the fracture has not moved your kneecap out of position. But sometimes surgery is needed to move the pieces of the kneecap back where they belong and to repair damage. Whether or not you have surgery, you probably will wear a cast or brace (immobilizer) on your leg for several weeks while the kneecap heals. Wear and take care of the cast or immobilizer exactly as your doctor advises. You may need help with daily tasks.    You heal best when you take good care of yourself. Eat a variety of healthy foods, and don't smoke.    Follow-up care is a key part of your treatment and safety. Be sure to make and go to all appointments, and call your doctor or nurse advice line (804 in most provinces and territories) if you are having problems. It's also a good idea to know your test results and keep a list of the medicines you take.    How can you care for yourself at home?  Follow your doctor's instructions for taking care of your cast or immobilizer, which is a protective brace that keeps your knee from moving. Do not remove it until your doctor says you can.  Put ice or a cold pack on your knee for 10 to 20 minutes at a time. Try to do this every 1 to 2 hours for the next 3 days (when you are awake) or until the swelling goes down. Put a thin cloth between the ice and your skin. Be careful not to get the cast or immobilizer wet.  Prop up the sore leg on a pillow when you ice it or anytime you sit or lie down during the next 3 days. Try to keep it above the level of your heart. This will help reduce swelling.  Do not use oils or lotions near your cast. If the skin becomes red or sore around the edge of the cast, you may pad the edges with a soft material, such as moleskin, or use tape to cover the edges.  Be safe with medicines. Read and follow all instructions on the label.  If the doctor gave you a prescription medicine for pain, take it as prescribed.  If you are not taking a prescription pain medicine, ask your doctor if you can take an over-the-counter medicine.  When should you call for help?  	  Call 911 anytime you think you may need emergency care. For example, call if:    You have chest pain, are short of breath, or you cough up blood.  You are very sleepy and you have trouble waking up.  Call your doctor or nurse advice line now or seek immediate medical care if:    You have new or worse nausea or vomiting.  You have new or worse pain.  Your foot is cool or pale or changes colour.  You have tingling, weakness, or numbness in your toes.  Your cast or splint feels too tight.  You have signs of a blood clot in your leg (called a deep vein thrombosis), such as:  Pain in your calf, back of the knee, thigh, or groin.  Redness or swelling in your leg.  Watch closely for changes in your health, and be sure to contact your doctor or nurse advice line if:    You have a problem with your splint or cast.  You do not get better as expected.

## 2025-04-22 NOTE — ED ADULT NURSE NOTE - NSFALLUNIVINTERV_ED_ALL_ED
No Bed/Stretcher in lowest position, wheels locked, appropriate side rails in place/Call bell, personal items and telephone in reach/Instruct patient to call for assistance before getting out of bed/chair/stretcher/Non-slip footwear applied when patient is off stretcher/[place] to call system/Physically safe environment - no spills, clutter or unnecessary equipment/Purposeful proactive rounding/Room/bathroom lighting operational, light cord in reach
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
## 2025-04-22 NOTE — ED ADULT TRIAGE NOTE - CHIEF COMPLAINT QUOTE
Patient complaint of L shoulder pain and wrist pain. Patient was seen at urgent care and told she might have slight fx on L shoulder, told to come to ED to be sure.

## 2025-04-22 NOTE — ED PROVIDER NOTE - CARE PROVIDER_API CALL
Josiah Thomas Joey  Orthopaedic Surgery  [address]  [place] [address]  Phone: [phone]  Fax: [phone]  Follow Up Time:

## 2025-04-22 NOTE — ED ADULT NURSE NOTE - OBJECTIVE STATEMENT
Patient complaint of L shoulder pain and wrist pain. Patient was seen at urgent care and told she might have slight fx on L shoulder, told to come to ED to be sure. Pt fell off treadmill last night.

## 2025-04-22 NOTE — ED ADULT NURSE NOTE - NS_NURSE_DISC_TEACHING_YN_ED_ALL_ED
Last refill: 10/22/24  qtY: 84 tabs  W/ 0 refills  Last ov:10/10/24    Requested Prescriptions     Pending Prescriptions Disp Refills    SPRINTEC 28 0.25-35 MG-MCG Oral Tab [Pharmacy Med Name: Sprintec 28 0.25-35 MG-MCG Oral Tablet] 84 tablet 0     Sig: Take 1 tablet by mouth once daily     No future appointments.        
Yes

## 2025-04-28 NOTE — CHART NOTE - NSCHARTNOTEFT_GEN_A_CORE
69 y o female presented to the ED on 4/22 with shoulder pain as per chart.  Per HIE, the patient has the recommended orthopedic appointment scheduled on 04/30 with Dr. Omer.

## 2025-04-30 ENCOUNTER — APPOINTMENT (OUTPATIENT)
Dept: ORTHOPEDIC SURGERY | Facility: CLINIC | Age: 70
End: 2025-04-30
Payer: MEDICARE

## 2025-04-30 VITALS — HEIGHT: 65 IN | BODY MASS INDEX: 33.32 KG/M2 | WEIGHT: 200 LBS

## 2025-04-30 DIAGNOSIS — S43.52XA SPRAIN OF LEFT ACROMIOCLAVICULAR JOINT, INITIAL ENCOUNTER: ICD-10-CM

## 2025-04-30 PROCEDURE — 99214 OFFICE O/P EST MOD 30 MIN: CPT

## 2025-07-09 ENCOUNTER — APPOINTMENT (OUTPATIENT)
Dept: ULTRASOUND IMAGING | Facility: HOSPITAL | Age: 70
End: 2025-07-09
Payer: MEDICARE

## 2025-07-09 ENCOUNTER — OUTPATIENT (OUTPATIENT)
Dept: OUTPATIENT SERVICES | Facility: HOSPITAL | Age: 70
LOS: 1 days | End: 2025-07-09
Payer: MEDICARE

## 2025-07-09 DIAGNOSIS — K76.0 FATTY (CHANGE OF) LIVER, NOT ELSEWHERE CLASSIFIED: ICD-10-CM

## 2025-07-09 PROCEDURE — 76700 US EXAM ABDOM COMPLETE: CPT

## 2025-07-09 PROCEDURE — 76700 US EXAM ABDOM COMPLETE: CPT | Mod: 26
